# Patient Record
Sex: FEMALE | Race: BLACK OR AFRICAN AMERICAN | NOT HISPANIC OR LATINO | Employment: FULL TIME | URBAN - METROPOLITAN AREA
[De-identification: names, ages, dates, MRNs, and addresses within clinical notes are randomized per-mention and may not be internally consistent; named-entity substitution may affect disease eponyms.]

---

## 2017-03-16 ENCOUNTER — LAB CONVERSION - ENCOUNTER (OUTPATIENT)
Dept: OTHER | Facility: OTHER | Age: 22
End: 2017-03-16

## 2017-03-16 LAB
RUBELLA, IGG (HISTORICAL): 4.09 INDEX
VARICELLA ZOSTER IGG (HISTORICAL): 2.67 INDEX

## 2017-06-22 ENCOUNTER — ALLSCRIPTS OFFICE VISIT (OUTPATIENT)
Dept: OTHER | Facility: OTHER | Age: 22
End: 2017-06-22

## 2018-01-14 VITALS
OXYGEN SATURATION: 98 % | TEMPERATURE: 98 F | RESPIRATION RATE: 17 BRPM | BODY MASS INDEX: 24.92 KG/M2 | DIASTOLIC BLOOD PRESSURE: 60 MMHG | HEIGHT: 64 IN | HEART RATE: 89 BPM | WEIGHT: 146 LBS | SYSTOLIC BLOOD PRESSURE: 100 MMHG

## 2018-01-15 NOTE — PROGRESS NOTES
Assessment    1  Routine physical examination (V70 0) (Z00 00)   2  Never a smoker   3  Dizziness (780 4) (R42)   4  Iron deficiency (280 9) (E61 1)   5  Screening for thyroid disorder (V77 0) (Z13 29)   6  Screening for diabetes mellitus (V77 1) (Z13 1)    Plan  Acne    · 2 - Jaden Wilder DO  (Dermatology) Physician Referral  Consult  Status: Hold For -  Scheduling  Requested for: [id number]  Care Summary provided  : Yes  Dizziness, Iron deficiency, Routine physical examination, Screening for diabetes  mellitus, Screening for thyroid disorder    · (1) FERRITIN; Status:Active; Requested ZYH:[id number];    · (1) IRON; Status:Active; Requested for:28Oct2016;    · (1) TIBC; Status:Active; Requested for:28Oct2016;    · (Q) CBC (H/H, RBC, INDICES, WBC, PLT); Status:Active; Requested for:28Oct2016;    · (Q) COMPREHENSIVE METABOLIC PNL W/ADJUSTED CALCIUM; Status:Active; Requested for:28Oct2016;    · (Q) TSH, 3RD GENERATION W/REFLEX TO FT4; Status:Active; Requested  [id number];     Discussion/Summary    Patient is a 26-year-old F  1  HM - patient appears well today  Reviewed health maintenance measures with her  It appears that she is up-to-date with immunizations  At this time, 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  She was advised that once she does receive her admission, if she does need any forms, she may drop these off   2  Dizziness/iron deficiency - patient was informed when she was noting blunt that her iron was too low  She denied have her blood taken at that time  At this time, her symptoms appear likely iron deficiency related  Check routine blood work, CBC, CMP, TSH, iron studies  Follow-up if any symptoms are worsening  Chief Complaint  Patient here for a physical; no forms  History of Present Illness  HM, Adult Female: The patient is being seen for a health maintenance evaluation  The last health maintenance visit was 2 year(s) ago  Social History: Work status: Dental office   The patient has never smoked cigarettes  She reports never drinking alcohol  General Health:   Lifestyle:  She consumes a diverse and healthy diet  She does not have any weight concerns  Reproductive health:  she reports abnormal menses  Menstrual history: Recent menstrual periods: bleeding has been excessive  The cycles have been regular  Screening:      Review of Systems    Constitutional: no fever, not feeling poorly, no chills and not feeling tired  ENT: no sore throat and no nasal discharge  Active Problems    1  Acne (706 1) (L70 9)    Past Medical History    · History of Dysuria (788 1) (R30 0)   · History of Female pelvic pain (625 9) (R10 2)   · History of hypoglycemia (V12 29) (Z86 39)   · History of tinea corporis (V12 09) (Z86 19)   · History of Vaginal candidiasis (112 1) (B37 3)   · History of Vaginal discharge (623 5) (N89 8)    Surgical History    · Denied: History Of Prior Surgery    Family History  Maternal Grandfather    · Family history of Stroke Syndrome (V17 1)    Social History    · Never a smoker    Current Meds   1  No Reported Medications Recorded    Allergies    1  No Known Drug Allergies    Vitals   Recorded: [id number] 98:52RQ   Systolic 611   Diastolic 70   Heart Rate 90   Temperature 97 9 F   LMP 23-Oct-2016   O2 Saturation 99, RA   Height 5 ft 3 5 in   Weight 136 lb    BMI Calculated 23 71   BSA Calculated 1 65     Physical Exam    Constitutional   General appearance: No acute distress, well appearing and well nourished  Head and Face   Head and face: Normal     Palpation of the face and sinuses: No sinus tenderness  Eyes   Conjunctiva and lids: No swelling, erythema or discharge  Pupils and irises: Equal, round, reactive to light  Ears, Nose, Mouth, and Throat   Otoscopic examination: Tympanic membranes translucent with normal light reflex  Canals patent without erythema  Hearing: Normal     Lips, teeth, and gums: Normal, good dentition      Oropharynx: Normal with no erythema, edema, exudate or lesions  Neck   Neck: Supple, symmetric, trachea midline, no masses  Thyroid: Normal, no thyromegaly  Pulmonary   Respiratory effort: No increased work of breathing or signs of respiratory distress  Auscultation of lungs: Clear to auscultation  Cardiovascular   Auscultation of heart: Normal rate and rhythm, normal S1 and S2, no murmurs  Examination of extremities for edema and/or varicosities: Normal     Abdomen   Abdomen: Non-tender, no masses  Lymphatic   Palpation of lymph nodes in neck: No lymphadenopathy  Musculoskeletal   Gait and station: Normal     Range of motion: Normal     Skin   Skin and subcutaneous tissue: Normal without rashes or lesions  Neurologic   Cranial nerves: Cranial nerves II-XII intact  Sensation: No sensory loss  Psychiatric   Judgment and insight: Normal     Orientation to person, place, and time: Normal     Recent and remote memory: Intact  Mood and affect: Normal        Results/Data  PHQ-9 Adult Depression Screening 28Oct2016 11:41AM User, s     Test Name Result Flag Reference   PHQ-9 Adult Depression Score 0     Over the last two weeks, how often have you been bothered by any of the following problems? Little interest or pleasure in doing things: Not at all - 0  Feeling down, depressed, or hopeless: Not at all - 0  Trouble falling or staying asleep, or sleeping too much: Not at all - 0  Feeling tired or having little energy: Not at all - 0  Poor appetite or over eating: Not at all - 0  Feeling bad about yourself - or that you are a failure or have let yourself or your family down: Not at all - 0  Trouble concentrating on things, such as reading the newspaper or watching television: Not at all - 0  Moving or speaking so slowly that other people could have noticed   Or the opposite -  being so fidgety or restless that you have been moving around a lot more than usual: Not at all - 0  Thoughts that you would be better off dead, or of hurting yourself in some way: Not at all - 0   PHQ-9 Adult Depression Screening Negative     PHQ-9 Difficulty Level Not difficult at all     PHQ-9 Severity No Depression         Signatures   Electronically signed by :  Leonard Guzman DO; Oct 28 2016 12:16PM EST                       (Author)

## 2018-01-17 NOTE — RESULT NOTES
Message   Please call patient, informed her that all of her recent blood work appeared stable except for her iron studies  She was found to be mildly anemic  Her iron levels as well Were mildly low  She may benefit from daily iron supplementation  I can call a prescription into the pharmacy,  If she would like  Thank you     Verified Results  (Q) CBC (H/H, RBC, INDICES, WBC, PLT) 28Oct2016 12:00AM Vonda, Ericab     Test Name Result Flag Reference   WHITE BLOOD CELL COUNT 5 4 Thousand/uL  3 8-10 8   RED BLOOD CELL COUNT 4 61 Million/uL  3 80-5 10   HEMOGLOBIN 11 6 g/dL L 11 7-15 5   HEMATOCRIT 36 1 %  35 0-45 0   MCV 78 2 fL L 80 0-100 0   MCH 25 2 pg L 27 0-33 0   MCHC 32 3 g/dL  32 0-36 0   RDW 15 9 % H 11 0-15 0   PLATELET COUNT 627 Thousand/uL  140-400   MPV 9 0 fL  7 5-11 5     (Q) COMPREHENSIVE METABOLIC PNL W/ADJUSTED CALCIUM 28Oct2016 12:00AM Vonda, Ihab     Test Name Result Flag Reference   GLUCOSE 79 mg/dL  65-99   Fasting reference interval   UREA NITROGEN (BUN) 9 mg/dL  7-25   CREATININE 0 68 mg/dL  0 50-1 10   eGFR NON-AFR   AMERICAN 125 mL/min/1 73m2  > OR = 60   eGFR AFRICAN AMERICAN 145 mL/min/1 73m2  > OR = 60   BUN/CREATININE RATIO   2-21   NOT APPLICABLE (calc)   SODIUM 139 mmol/L  135-146   POTASSIUM 4 5 mmol/L  3 5-5 3   CHLORIDE 104 mmol/L     CARBON DIOXIDE 25 mmol/L  20-31   CALCIUM 9 6 mg/dL  8 6-10 2   CALCIUM (ADJUSTED FOR$ALBUMIN) 9 7 mg/dL (calc)  8 6-10 2   PROTEIN, TOTAL 7 2 g/dL  6 1-8 1   ALBUMIN 4 2 g/dL  3 6-5 1   GLOBULIN 3 0 g/dL (calc)  1 9-3 7   ALBUMIN/GLOBULIN RATIO 1 4 (calc)  1 0-2 5   BILIRUBIN, TOTAL 0 3 mg/dL  0 2-1 2   ALKALINE PHOSPHATASE 90 U/L     AST 15 U/L  10-30   ALT 9 U/L  6-29     (Q) TSH, 3RD GENERATION W/REFLEX TO FT4 28Oct2016 12:00AM Vonda Ihab     Test Name Result Flag Reference   TSH W/REFLEX TO FT4 1 26 mIU/L     Reference Range                         > or = 20 Years  0 40-4 50                              Pregnancy Ranges First trimester    0 26-2 66            Second trimester   0 55-2 73            Third trimester    0 43-2 91     (1) IRON SATURATION %, TIBC 28Oct2016 12:00AM Prabhjot Ferrari     Test Name Result Flag Reference   IRON, TOTAL 30 mcg/dL L    IRON BINDING CAPACITY 399 mcg/dL (calc)  250-450   % SATURATION 8 % (calc) L 11-50     (1) OP ELIDA FERRITIN 28Oct2016 12:00AM Scout Jefferson     Test Name Result Flag Reference   FERRITIN 14 ng/mL

## 2020-03-23 ENCOUNTER — NURSE TRIAGE (OUTPATIENT)
Dept: OTHER | Facility: OTHER | Age: 25
End: 2020-03-23

## 2020-03-23 NOTE — TELEPHONE ENCOUNTER
Regarding: Coughing, body aches, and stuffy nose  ----- Message from [facility] sent at 3/22/2020  4:52 PM EDT -----  PT started experiencing a tickle in her throat a few days ago which turned into a mix of a wet and dry cough  She said she feels a little warm,but doesn't feel as though she has a fever  Stuffy nose and body aches

## 2020-03-23 NOTE — TELEPHONE ENCOUNTER
Reason for Disposition   Health Information question, no triage required and triager able to answer question    Protocols used: INFORMATION ONLY CALL-ADULT-

## 2020-03-24 ENCOUNTER — NURSE TRIAGE (OUTPATIENT)
Dept: OTHER | Facility: OTHER | Age: 25
End: 2020-03-24

## 2020-03-25 ENCOUNTER — TELEPHONE (OUTPATIENT)
Dept: FAMILY MEDICINE CLINIC | Facility: CLINIC | Age: 25
End: 2020-03-25

## 2020-03-25 NOTE — TELEPHONE ENCOUNTER
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

## 2020-03-25 NOTE — TELEPHONE ENCOUNTER
Please call patient  I see that she spoke to the on-call/health call last night  Calling to see how she is doing today  I did receive a phone message from her mother also last night  It stated that she had loss smell and taste     If she does not have fever cough chest pain or shortness of breath than she should just be continued to stay at home, socially distance

## 2020-03-25 NOTE — TELEPHONE ENCOUNTER
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 Do you have a weakened immune system?"  Pt denied    Per pt, she is living in [place]  Her mother became worried when she said that she could not smell and urged her to call healthcall      Protocols used: CORONAVIRUS (COVID-19) EXPOSURE-ADULT-